# Patient Record
(demographics unavailable — no encounter records)

---

## 2025-06-11 NOTE — PHYSICAL EXAM
[Appropriately responsive] : appropriately responsive [Alert] : alert [No Acute Distress] : no acute distress [Regular Rate Rhythm] : regular rate rhythm [No Lymphadenopathy] : no lymphadenopathy [No Murmurs] : no murmurs [Clear to Auscultation B/L] : clear to auscultation bilaterally [Soft] : soft [Non-tender] : non-tender [Non-distended] : non-distended [No HSM] : No HSM [No Lesions] : no lesions [Oriented x3] : oriented x3 [No Mass] : no mass [Examination Of The Breasts] : a normal appearance [] : implants [No Masses] : no breast masses were palpable [Labia Minora] : normal [Labia Majora] : normal [Normal] : normal [Uterine Adnexae] : normal

## 2025-06-11 NOTE — PLAN
[FreeTextEntry1] : Pap obtained Self breast exams, safe sex, diet and exercise discussed HSV meds refilled RTO for nexplanon removal

## 2025-06-11 NOTE — HISTORY OF PRESENT ILLNESS
[FreeTextEntry1] : Patient presents for her initial visit.  States that she has had a nexplanon for 3 years and needs it removed - requesting ocps to replace.   Reports no abdominal or pelvic pain, change in discharge, change in bowel or bladder habits or any other concerns.  Due for pap.

## 2025-06-18 NOTE — HISTORY OF PRESENT ILLNESS
[FreeTextEntry1] : Patient presents for nexplanon removal.  Consent obtained and all questions answered.  Patient tolerated procedure well. To start OCPs.  Instructions reviewed.